# Patient Record
Sex: MALE | Race: WHITE | NOT HISPANIC OR LATINO | URBAN - METROPOLITAN AREA
[De-identification: names, ages, dates, MRNs, and addresses within clinical notes are randomized per-mention and may not be internally consistent; named-entity substitution may affect disease eponyms.]

---

## 2020-10-30 ENCOUNTER — EMERGENCY (EMERGENCY)
Facility: HOSPITAL | Age: 25
LOS: 0 days | Discharge: HOME | End: 2020-10-30
Payer: SELF-PAY

## 2020-10-30 VITALS
DIASTOLIC BLOOD PRESSURE: 57 MMHG | TEMPERATURE: 97 F | SYSTOLIC BLOOD PRESSURE: 137 MMHG | OXYGEN SATURATION: 100 % | RESPIRATION RATE: 19 BRPM | HEART RATE: 58 BPM

## 2020-10-30 DIAGNOSIS — B34.9 VIRAL INFECTION, UNSPECIFIED: ICD-10-CM

## 2020-10-30 DIAGNOSIS — J34.89 OTHER SPECIFIED DISORDERS OF NOSE AND NASAL SINUSES: ICD-10-CM

## 2020-10-30 LAB — SARS-COV-2 RNA SPEC QL NAA+PROBE: SIGNIFICANT CHANGE UP

## 2020-10-30 PROCEDURE — 99283 EMERGENCY DEPT VISIT LOW MDM: CPT

## 2020-10-30 PROCEDURE — U0003: CPT

## 2020-10-30 NOTE — ED STATDOCS - NSFOLLOWUPINSTRUCTIONS_ED_ALL_ED_FT
How to get your Coronavirus (COVID-19) Testing Results:   Please be advised that you were tested for the coronavirus (COVID-19) in the Emergency Department at [facility].  You are to maintain self-quarantine procedures for 14 days until instructed otherwise by one of our healthcare agents. Please note that the test may take up to 2-4 days to result.  If you do not hear from us within 72 hours and you'd like to check on your results, you can call on of our coronavirus specialists at [address] [place] [address] (available 24/7).  Please DO NOT call the site where you received the test to obtain your results.

## 2020-10-30 NOTE — ED STATDOCS - NS ED ROS FT
ROS: Constitutional- no fever, no chills.  Respiratory- no cough, no SOB  Cardiac- no chest pain, no palpitations, ENT- +rhinorrhea, no sore throat, +congestion.  Abdomen- No nausea, no vomiting, no diarrhea.  Urinary- no dysuria, no urgency, no frequency.  Skin- No rashes

## 2020-10-30 NOTE — ED ADULT TRIAGE NOTE - CHIEF COMPLAINT QUOTE
pt presents to ED for covid testing pt with yi pt provides verbal consent to receive results via text

## 2020-10-30 NOTE — ED ADULT NURSE NOTE - CAS ELECT INFOMATION PROVIDED
DISCHARGE INSTRUCTIONS REVIEWED WITH PATIENT VERBALLY, PT VERBALIZED UNDERSTANDING OF DISCHARGE INSTRUCTIONS. PAPER COPY OF DISCHARGE INSTRUCTIONS GIVEN TO PATIENT WITH SELF QUARANTINE AND COVID 19 INFORMATION.pt provides verbal consent to receive results via text or email/DC instructions

## 2020-10-30 NOTE — ED STATDOCS - PATIENT PORTAL LINK FT
You can access the FollowMyHealth Patient Portal offered by [facility] by registering at the following website: [email or web address]. By joining [facility]’s FollowMyHealth portal, you will also be able to view your health information using other applications (apps) compatible with our system.

## 2021-08-25 ENCOUNTER — INPATIENT (INPATIENT)
Facility: HOSPITAL | Age: 26
LOS: 0 days | Discharge: LEFT BEFORE DISCHARGE | End: 2021-08-25
Attending: SURGERY | Admitting: SURGERY
Payer: MEDICAID

## 2021-08-25 VITALS — WEIGHT: 169.98 LBS | HEIGHT: 69 IN

## 2021-08-25 VITALS
SYSTOLIC BLOOD PRESSURE: 126 MMHG | TEMPERATURE: 98 F | RESPIRATION RATE: 18 BRPM | DIASTOLIC BLOOD PRESSURE: 68 MMHG | HEART RATE: 62 BPM | OXYGEN SATURATION: 98 %

## 2021-08-25 DIAGNOSIS — S06.0X1A CONCUSSION WITH LOSS OF CONSCIOUSNESS OF 30 MINUTES OR LESS, INITIAL ENCOUNTER: ICD-10-CM

## 2021-08-25 PROCEDURE — 72125 CT NECK SPINE W/O DYE: CPT | Mod: 26,ME

## 2021-08-25 PROCEDURE — 70450 CT HEAD/BRAIN W/O DYE: CPT | Mod: 26,MA

## 2021-08-25 PROCEDURE — G1004: CPT

## 2021-08-25 PROCEDURE — 71250 CT THORAX DX C-: CPT | Mod: 26,ME

## 2021-08-25 PROCEDURE — 74176 CT ABD & PELVIS W/O CONTRAST: CPT | Mod: 26,ME

## 2021-08-25 PROCEDURE — 99283 EMERGENCY DEPT VISIT LOW MDM: CPT

## 2021-08-25 PROCEDURE — 99285 EMERGENCY DEPT VISIT HI MDM: CPT

## 2021-08-25 RX ORDER — ONDANSETRON 8 MG/1
4 TABLET, FILM COATED ORAL EVERY 6 HOURS
Refills: 0 | Status: DISCONTINUED | OUTPATIENT
Start: 2021-08-25 | End: 2021-08-25

## 2021-08-25 RX ORDER — ACETAMINOPHEN 500 MG
650 TABLET ORAL EVERY 6 HOURS
Refills: 0 | Status: DISCONTINUED | OUTPATIENT
Start: 2021-08-25 | End: 2021-08-25

## 2021-08-25 NOTE — ED PROVIDER NOTE - NSICDXNOPASTMEDICALHX_GEN_ALL_ED
"Chief Complaint   Patient presents with     Consult     Frequent incontinence of bowels       Vitals:    01/28/20 1800   BP: (!) 173/101   BP Location: Left arm   Patient Position: Sitting   Cuff Size: Adult Regular   Pulse: 93   Resp: 16   Temp: 97.8  F (36.6  C)   TempSrc: Oral   SpO2: 98%   Weight: 64.5 kg (142 lb 3.2 oz)   Height: 1.727 m (5' 8\")       Body mass index is 21.62 kg/m .      Georgia Kiser LPN                          " <-- Click to add NO pertinent Past Medical History
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
## 2021-08-25 NOTE — ED PROVIDER NOTE - FAMILY DETAILS FREE TEXT FOR MDM ADDL HISTORY OBTAINED FROM QUESTION
partner in person  Patient's father via phone Mr/Dr Camejo with patient's consent to discuss findings

## 2021-08-25 NOTE — ED PROVIDER NOTE - ENMT, MLM
Airway patent, Nasal mucosa clear. Mouth with normal mucosa. Throat has no vesicles, no oropharyngeal exudates and uvula is midline. Airway patent, Nasal mucosa clear. Mouth with normal mucosa. Throat has no vesicles, no oropharyngeal exudates and uvula is midline. no epistaxis No loose dentition.

## 2021-08-25 NOTE — H&P ADULT - NSHPLABSRESULTS_GEN_ALL_CORE
Vitals:  T(C): 36.8 (08-25 @ 18:23), Max: 36.8 (08-25 @ 18:23)  HR: 64 (08-25 @ 18:23) (64 - 64)  BP: 122/64 (08-25 @ 18:23) (122/64 - 122/64)  RR: 18 (08-25 @ 18:23) (18 - 18)  SpO2: 97% (08-25 @ 18:23) (97% - 97%)      < from: CT Head No Cont (08.25.21 @ 19:11) >      EXAM:  CT BRAIN                            PROCEDURE DATE:  08/25/2021          INTERPRETATION:  Clinical indications: Trauma    Technique:  Multiple axial sections were acquired from the base of the skull to the vertex without contrast enhancement.    Findings:  The lateral ventricles have a normal configuration.    There is no evidence of acute hemorrhage, mass or mass-effect in the posterior fossa or in the supratentorial region.    Evaluation of the osseous structures with the appropriate window appears unremarkable.    Minimal mucosal thickening seen involving both maxillary ethmoid and sphenoid sinuses.    Both mastoid and middle ear regions appear clear.    Impression:  Minimal mucosal thickening involving the paranasal sinuses as described above, otherwise noncontrast head CT.    --- End of Report ---            JESSICA MAURICIO MD; Attending Radiologist  This document has been electronically signed. Aug 25 2021  7:00PM    < end of copied text > Labs:    EXAM:  CT ABDOMEN AND PELVIS                        EXAM:  CT CHEST                        PROCEDURE DATE:  08/25/2021    INTERPRETATION:  CLINICAL INFORMATION: Trauma  COMPARISON: None.  IMPRESSION:  Mild age-indeterminate loss of height of T4 vertebral body. Correlate with the point tenderness/site of injury.  Otherwise no acute findings chest abdomen and pelvis on this noncontrast study    EXAM:  CT CERVICAL SPINE                        PROCEDURE DATE:  08/25/2021    INTERPRETATION:  Clinical indication: Trauma  IMPRESSION: No acute fracture or dislocation is seen involving the cervical spine.    EXAM:  CT BRAIN                        PROCEDURE DATE:  08/25/2021    INTERPRETATION:  Clinical indications: Trauma  Impression:  Minimal mucosal thickening involving the paranasal sinuses as described above, otherwise noncontrast head CT.

## 2021-08-25 NOTE — H&P ADULT - NSHPPHYSICALEXAM_GEN_ALL_CORE
Physical Exam:  General: AAOx3, Well developed, NAD  HEENT: Tenderness to left posterior temporal region with no obvious deformities or step-offs  Chest: Normal respiratory effort  Heart: RRR  Abdomen: Soft, NTND, no masses  Neuro/Psych: Experiencing dizziness and slurred speech; No localized deficits.   Skin: Normal, no rashes, no lesions noted.   Extremities: Warm, well perfused, no edema, Pulses intact Physical Exam:  General: AAOx3, Well developed, NAD  HEENT: Tenderness to left posterior temporal region with no obvious deformities or step-offs  Chest: Normal respiratory effort  Heart: RRR  Abdomen: Soft, NTND, no masses  Neuro/Psych: Experiencing dizziness and slurred speech; No localized deficits.   Skin: Normal, no rashes, no lesions noted.   Extremities: Warm, well perfused, no edema, Pulses intact    Attending exam:  GCS of 15  Airway is patent  Breathing is symmetric and unlabored  Neuro: CNII-XII grossly intact  Psych: normal affect  HEENT: Normocephalic, +tenderness to left occipital region, NOAM, EOM wnl, no otorrhea or hemotympanum b/l, no epistaxis or d/c b/l nares,   Neck: Soft and supple, nontender to passive/active ROM exam. No crepitus, no ecchymosis, no hematoma, to exam, no JVD, no tracheal deviation  Cspine/thoracolumbrosacral spine: no gross bony pathology or tenderness to exam  Cardiovascular: S1S2 Present  Chest: no gross rib pathology or tenderness to exam. No sternal pathology or tenderness to exam. No crepitus, no ecchymosis, no hematoma. No penetrating thorcoabdominal trauma  Respiratory: Rate is 18; Respiratory Effort normal; no wheezes, rales or rhonchi to exam  ABD: bowel sounds (+), soft, nontender, non distended, no rebound, no guarding, no rigidity, no skin changes to exam. No pelvic instability to exam, no skin changes  Genitourinary: No scrotal/perineal/perirectal hematoma/ecchymosis/tenderness to exam  External genitalia: normal, no blood at urethral meatus  Musculoskeletal: Pt has palpable b/l radial, femoral, dorsalis pedis pulses. All digits are warm and well perfused. No gross long bone pathology or tenderness to exam. Pt demonstrates grossly intact sensoromotor function. Pt has good capillary refill to digits, no calf edema or tenderness to exam.  Skin: no lesions or rashes to exam  Vitals: see trauma flow sheet
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
## 2021-08-25 NOTE — ED ADULT NURSE REASSESSMENT NOTE - NS ED NURSE REASSESS COMMENT FT1
pt states he wants to sign out AMA, he states "I am not doing a covid swab, I am vaccinated, I want to go home." educated pt about the policy regarding admission pts needing a covid swab, he states he will not do one. called surgical resident MD Shi regarding pt wanting to sign out AMA, MD Shi spoke with pt as well pt still voiced that he wanted to sign out AMA. MD shi signed AMA forms, I witnessed and signed as well. Charge ZAK barnett.
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
## 2021-08-25 NOTE — ED PROVIDER NOTE - EYES, MLM
Clear bilaterally, pupils equal, round and reactive to light. Clear bilaterally, pupils equal, round and reactive to light. EOIM. Visual fields intact x 4 quadrants.
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
## 2021-08-25 NOTE — ED PROVIDER NOTE - CONSULTANT FREE TEXT FOR MDM DISCUSSED CASE WITH QUESTION
Dr Oshea - Via Dr. Marie resident physician team (thank you for coming to so quickly and attending this patient's care)
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
## 2021-08-25 NOTE — ED PROVIDER NOTE - CARDIAC, MLM
Normal rate, regular rhythm.  Heart sounds S1, S2.  No murmurs, rubs or gallops. Normal rate, regular rhythm.  Heart sounds S1, S2.  No rubs or gallops. 2+ pulses in bilateral dp and radial arteries. Cap refill less than 3 seconds.
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
## 2021-08-25 NOTE — CHART NOTE - NSCHARTNOTEFT_GEN_A_CORE
Patient adamant about leaving AMA  Explained the risks of head trauma and concussion and benefits of close observation  Patient resistant to required COVID screening and Neuro monitoring  Patient given paperwork and allowed to leave AMA    Dr. Preciado notified.
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
## 2021-08-25 NOTE — ED ADULT NURSE NOTE - NS ED NURSE DISCH DISPOSITION
AMA (saw a physician/midlevel provider and clinician was able to provide reasons for staying for treatment & form is signed)
unknown
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
## 2021-08-25 NOTE — ED PROVIDER NOTE - ENMT NEGATIVE STATEMENT, MLM
Ears: no ear pain and no hearing problems intermittent tinnitus. Nose: no nasal congestion and no nasal drainage. Mouth/Throat: no dysphagia, no hoarseness and no throat pain. Neck: no lumps, no pain, no stiffness and no swollen glands.

## 2021-08-25 NOTE — ED PROVIDER NOTE - NS_ ATTENDINGSCRIBEDETAILS _ED_A_ED_FT
I Jose Manuel Lofton MD saw and examined the patient. Scribe documented for me and under my supervision. I have modified the scribe's documentation where necessary to reflect my history, physical exam and other relevant documentations pertinent to the care of the patient.
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
## 2021-08-26 NOTE — CHART NOTE - NSCHARTNOTEFT_GEN_A_CORE
Was called at 6:51am regarding consult for head injury. Noted patient had signed out AMA in early morning hours, per ED staff patient refused COVID Swab.  Patient can follow up in concussion clinic as outpatient can call [phone] for appointment.
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
## 2021-08-30 DIAGNOSIS — S06.0X1A CONCUSSION WITH LOSS OF CONSCIOUSNESS OF 30 MINUTES OR LESS, INITIAL ENCOUNTER: ICD-10-CM
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
## 2022-02-25 ENCOUNTER — FORM ENCOUNTER (OUTPATIENT)
Age: 27
End: 2022-02-25

## 2022-08-10 ENCOUNTER — APPOINTMENT (OUTPATIENT)
Dept: OBGYN | Facility: CLINIC | Age: 27
End: 2022-08-10

## 2022-08-11 ENCOUNTER — NON-APPOINTMENT (OUTPATIENT)
Age: 27
End: 2022-08-11

## 2023-01-21 PROBLEM — Z78.9 OTHER SPECIFIED HEALTH STATUS: Chronic | Status: ACTIVE | Noted: 2021-08-25

## 2023-01-25 ENCOUNTER — RESULT CHARGE (OUTPATIENT)
Age: 28
End: 2023-01-25

## 2023-01-25 ENCOUNTER — APPOINTMENT (OUTPATIENT)
Dept: FAMILY MEDICINE | Facility: CLINIC | Age: 28
End: 2023-01-25
Payer: MEDICAID

## 2023-01-25 ENCOUNTER — NON-APPOINTMENT (OUTPATIENT)
Age: 28
End: 2023-01-25

## 2023-01-25 DIAGNOSIS — Z78.9 OTHER SPECIFIED HEALTH STATUS: ICD-10-CM

## 2023-01-25 DIAGNOSIS — Z82.3 FAMILY HISTORY OF STROKE: ICD-10-CM

## 2023-01-25 DIAGNOSIS — Z00.00 ENCOUNTER FOR GENERAL ADULT MEDICAL EXAMINATION W/OUT ABNORMAL FINDINGS: ICD-10-CM

## 2023-01-25 DIAGNOSIS — Z82.49 FAMILY HISTORY OF ISCHEMIC HEART DISEASE AND OTHER DISEASES OF THE CIRCULATORY SYSTEM: ICD-10-CM

## 2023-01-25 DIAGNOSIS — Z83.438 FAMILY HISTORY OF OTHER DISORDER OF LIPOPROTEIN METABOLISM AND OTHER LIPIDEMIA: ICD-10-CM

## 2023-01-25 DIAGNOSIS — Z83.3 FAMILY HISTORY OF DIABETES MELLITUS: ICD-10-CM

## 2023-01-25 LAB
BILIRUB UR QL STRIP: NORMAL
CLARITY UR: CLEAR
COLLECTION METHOD: NORMAL
GLUCOSE UR-MCNC: NORMAL
HCG UR QL: 1 EU/DL
HGB UR QL STRIP.AUTO: NORMAL
KETONES UR-MCNC: NORMAL
LEUKOCYTE ESTERASE UR QL STRIP: NORMAL
NITRITE UR QL STRIP: NORMAL
PH UR STRIP: 6
PROT UR STRIP-MCNC: NORMAL
SP GR UR STRIP: 1.02

## 2023-01-25 PROCEDURE — 81003 URINALYSIS AUTO W/O SCOPE: CPT | Mod: QW

## 2023-01-25 PROCEDURE — 92551 PURE TONE HEARING TEST AIR: CPT

## 2023-01-25 PROCEDURE — 99385 PREV VISIT NEW AGE 18-39: CPT | Mod: 25

## 2023-01-25 PROCEDURE — 99173 VISUAL ACUITY SCREEN: CPT

## 2023-01-25 RX ORDER — DEXTROAMPHETAMINE SACCHARATE, AMPHETAMINE ASPARTATE MONOHYDRATE, DEXTROAMPHETAMINE SULFATE AND AMPHETAMINE SULFATE 5; 5; 5; 5 MG/1; MG/1; MG/1; MG/1
20 CAPSULE, EXTENDED RELEASE ORAL DAILY
Qty: 30 | Refills: 0 | Status: DISCONTINUED | COMMUNITY
Start: 2022-08-11 | End: 2023-01-25

## 2023-01-25 NOTE — HEALTH RISK ASSESSMENT
[Former] : Former [Yes] : Yes [Monthly or less (1 pt)] : Monthly or less (1 point) [1 or 2 (0 pts)] : 1 or 2 (0 points) [Never (0 pts)] : Never (0 points) [No] : In the past 12 months have you used drugs other than those required for medical reasons? No [No falls in past year] : Patient reported no falls in the past year [0] : 2) Feeling down, depressed, or hopeless: Not at all (0) [PHQ-2 Negative - No further assessment needed] : PHQ-2 Negative - No further assessment needed [HIV test declined] : HIV test declined [Hepatitis C test declined] : Hepatitis C test declined [With Significant Other] : lives with significant other [# of Members in Household ___] :  household currently consist of [unfilled] member(s) [Employed] : employed [College] : College [Single] : single [Sexually Active] : sexually active [Feels Safe at Home] : Feels safe at home [Fully functional (bathing, dressing, toileting, transferring, walking, feeding)] : Fully functional (bathing, dressing, toileting, transferring, walking, feeding) [Fully functional (using the telephone, shopping, preparing meals, housekeeping, doing laundry, using] : Fully functional and needs no help or supervision to perform IADLs (using the telephone, shopping, preparing meals, housekeeping, doing laundry, using transportation, managing medications and managing finances) [Reports normal functional visual acuity (ie: able to read med bottle)] : Reports normal functional visual acuity [Smoke Detector] : smoke detector [Carbon Monoxide Detector] : carbon monoxide detector [Safety elements used in home] : safety elements used in home [Seat Belt] :  uses seat belt [Sunscreen] : uses sunscreen [Travel to Developing Areas] : travel to developing areas [Audit-CScore] : 1 [FME0Ayekf] : 0 [Change in mental status noted] : No change in mental status noted [Language] : denies difficulty with language [Behavior] : denies difficulty with behavior [Learning/Retaining New Information] : denies difficulty learning/retaining new information [Handling Complex Tasks] : denies difficulty handling complex tasks [Reasoning] : denies difficulty with reasoning [Spatial Ability and Orientation] : denies difficulty with spatial ability and orientation [Reports changes in hearing] : Reports no changes in hearing [Reports changes in vision] : Reports no changes in vision [Reports changes in dental health] : Reports no changes in dental health [Guns at Home] : no guns at home [TB Exposure] : is not being exposed to tuberculosis [Caregiver Concerns] : does not have caregiver concerns [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

## 2023-01-25 NOTE — PHYSICAL EXAM

## 2023-01-26 LAB
ALBUMIN SERPL ELPH-MCNC: 4.8 G/DL
ALP BLD-CCNC: 56 U/L
ALT SERPL-CCNC: 42 U/L
ANION GAP SERPL CALC-SCNC: 14 MMOL/L
AST SERPL-CCNC: 27 U/L
BASOPHILS # BLD AUTO: 0.05 K/UL
BASOPHILS NFR BLD AUTO: 0.9 %
BILIRUB SERPL-MCNC: 0.7 MG/DL
BUN SERPL-MCNC: 14 MG/DL
CALCIUM SERPL-MCNC: 10.2 MG/DL
CHLORIDE SERPL-SCNC: 104 MMOL/L
CHOLEST SERPL-MCNC: 182 MG/DL
CO2 SERPL-SCNC: 22 MMOL/L
CREAT SERPL-MCNC: 0.96 MG/DL
EGFR: 112 ML/MIN/1.73M2
EOSINOPHIL # BLD AUTO: 0.71 K/UL
EOSINOPHIL NFR BLD AUTO: 12.2 %
GLUCOSE SERPL-MCNC: 86 MG/DL
HBV SURFACE AB SER QL: REACTIVE
HCT VFR BLD CALC: 44.3 %
HCV AB SER QL: NONREACTIVE
HCV S/CO RATIO: 0.06 S/CO
HDLC SERPL-MCNC: 66 MG/DL
HGB BLD-MCNC: 15.1 G/DL
IMM GRANULOCYTES NFR BLD AUTO: 0.2 %
LDLC SERPL CALC-MCNC: 96 MG/DL
LYMPHOCYTES # BLD AUTO: 1.81 K/UL
LYMPHOCYTES NFR BLD AUTO: 31.2 %
MAN DIFF?: NORMAL
MCHC RBC-ENTMCNC: 29.8 PG
MCHC RBC-ENTMCNC: 34.1 GM/DL
MCV RBC AUTO: 87.4 FL
MEV IGG FLD QL IA: 26.6 AU/ML
MEV IGG+IGM SER-IMP: POSITIVE
MONOCYTES # BLD AUTO: 0.57 K/UL
MONOCYTES NFR BLD AUTO: 9.8 %
NEUTROPHILS # BLD AUTO: 2.66 K/UL
NEUTROPHILS NFR BLD AUTO: 45.7 %
NONHDLC SERPL-MCNC: 116 MG/DL
PLATELET # BLD AUTO: 267 K/UL
POTASSIUM SERPL-SCNC: 4.9 MMOL/L
PROT SERPL-MCNC: 6.7 G/DL
RBC # BLD: 5.07 M/UL
RBC # FLD: 14.1 %
RUBV IGG FLD-ACNC: 0.7 INDEX
RUBV IGG SER-IMP: NEGATIVE
SODIUM SERPL-SCNC: 140 MMOL/L
T3FREE SERPL-MCNC: 3.5 PG/ML
T4 FREE SERPL-MCNC: 1.2 NG/DL
TRIGL SERPL-MCNC: 97 MG/DL
TSH SERPL-ACNC: 1.43 UIU/ML
VZV AB TITR SER: POSITIVE
VZV IGG SER IF-ACNC: 347.5 INDEX
WBC # FLD AUTO: 5.81 K/UL

## 2023-01-27 LAB
MUV AB SER-ACNC: NORMAL
MUV IGG SER QL IA: 9.1 AU/ML

## 2023-01-31 ENCOUNTER — TRANSCRIPTION ENCOUNTER (OUTPATIENT)
Age: 28
End: 2023-01-31

## 2023-02-01 ENCOUNTER — APPOINTMENT (OUTPATIENT)
Dept: FAMILY MEDICINE | Facility: CLINIC | Age: 28
End: 2023-02-01
Payer: MEDICAID

## 2023-02-01 VITALS
HEIGHT: 69 IN | BODY MASS INDEX: 27.4 KG/M2 | HEART RATE: 69 BPM | TEMPERATURE: 98.2 F | OXYGEN SATURATION: 97 % | WEIGHT: 185 LBS

## 2023-02-01 VITALS — DIASTOLIC BLOOD PRESSURE: 64 MMHG | SYSTOLIC BLOOD PRESSURE: 120 MMHG

## 2023-02-01 PROCEDURE — 99213 OFFICE O/P EST LOW 20 MIN: CPT | Mod: 25

## 2023-02-01 PROCEDURE — 90471 IMMUNIZATION ADMIN: CPT

## 2023-02-01 PROCEDURE — 90707 MMR VACCINE SC: CPT

## 2023-02-01 PROCEDURE — 85610 PROTHROMBIN TIME: CPT | Mod: QW

## 2023-02-01 NOTE — HISTORY OF PRESENT ILLNESS
[FreeTextEntry8] : Pt is here for MMR vaccine and PPD #2.\par Also needed new Rx for adderal since pharmacy did not have it in stock\par Feels well today. No recent colds or infections.

## 2023-02-21 NOTE — ED ADULT NURSE NOTE - PUPILS PERRL
PLAN  Activity restrictions: Continue to take it easy. No bending over, sleep elevated, avoid scrubbing / rubbing area.   Apply Vaseline to suture lines twice daily.   Apply Nitro paste to purple area of nose flap twice daily for 7 days. RN will call you to update you if Dr. Ayala has further recommendations.    Monitor for signs of infection including, but not limited to fevers, chills, redness along incision, drainage from incisions, purulent drainage from incision or drain, etc.    Follow-up in clinic in 1 week for 2 week post-op follow-up    yes

## 2023-03-15 ENCOUNTER — NON-APPOINTMENT (OUTPATIENT)
Age: 28
End: 2023-03-15

## 2023-04-04 ENCOUNTER — APPOINTMENT (OUTPATIENT)
Dept: INTERNAL MEDICINE | Facility: CLINIC | Age: 28
End: 2023-04-04
Payer: MEDICAID

## 2023-04-04 VITALS
SYSTOLIC BLOOD PRESSURE: 128 MMHG | HEIGHT: 68.5 IN | WEIGHT: 180 LBS | HEART RATE: 65 BPM | BODY MASS INDEX: 26.97 KG/M2 | RESPIRATION RATE: 18 BRPM | DIASTOLIC BLOOD PRESSURE: 75 MMHG | OXYGEN SATURATION: 96 % | TEMPERATURE: 97.4 F

## 2023-04-04 DIAGNOSIS — Z87.891 PERSONAL HISTORY OF NICOTINE DEPENDENCE: ICD-10-CM

## 2023-04-04 PROCEDURE — 94729 DIFFUSING CAPACITY: CPT

## 2023-04-04 PROCEDURE — 99204 OFFICE O/P NEW MOD 45 MIN: CPT | Mod: 25

## 2023-04-04 PROCEDURE — ZZZZZ: CPT

## 2023-04-04 PROCEDURE — 94727 GAS DIL/WSHOT DETER LNG VOL: CPT

## 2023-04-04 PROCEDURE — 94060 EVALUATION OF WHEEZING: CPT

## 2023-04-04 RX ORDER — BUDESONIDE 90 UG/1
90 AEROSOL, POWDER RESPIRATORY (INHALATION)
Qty: 1 | Refills: 5 | Status: DISCONTINUED | COMMUNITY
Start: 2023-03-13 | End: 2023-04-04

## 2023-04-04 NOTE — DISCUSSION/SUMMARY
[FreeTextEntry1] : Mr. Camejo is a 28-year-old male who presents for pulmonary evaluation.  He has a history of mild, persistent asthma.  He is currently well controlled on Asmanex 100 mcg 1 puff twice daily.  He does not require albuterol for rescue therapy.  Complete pulmonary function test show no evidence of obstructive lung disease.  Patient does not require oral steroids.  He will follow-up in this office in 1 year.

## 2023-04-04 NOTE — HISTORY OF PRESENT ILLNESS
[TextBox_4] : Mr. Camejo is a 28-year-old male who presents for initial pulmonary evaluation.  The patient has a history of intermittent asthma since childhood.  He is currently asymptomatic.  The patient is currently taking Asmanex 100 mcg 1 puff daily.  He has no nocturnal symptoms of cough or shortness of breath.  There is no exercise-induced symptoms.  He is able to run 4 miles without difficulty.  Mr. Camejo has never been admitted to the hospital for asthma or required emergency room treatment.  Patient has not required oral corticosteroids.  Patient smoked socially from age 16 and stopped smoking at age 24.

## 2023-04-04 NOTE — PROCEDURE
[FreeTextEntry1] : Complete pulmonary function test were performed.\par FVC 5.52 L which is 106% predicted.\par FEV1 4.90 L which is 113% predicted.\par FEV1/FVC ratio 89%.\par FEF 25/75% 6.59 L/s which is 144% predicted.\par PEF 12.08 L/s which is 122% predicted.\par Postbronchodilator: FVC 5.59 L which is 107% predicted.  FEV1 4.90 L which is 112% predicted.  FEV1/FVC ratio 104%.  There is no significant bronchodilator response.\par Total lung capacity is 6.56 L which is 96% predicted.\par Diffusion capacity is 106% predicted.\par \par Complete pulmonary function test showed no evidence of significant obstructive or restrictive lung disease.  Diffusing capacity is normal.

## 2023-04-19 NOTE — ED PROVIDER NOTE - NEUROLOGICAL, MLM
- - - CN 2-12 intact, NIH=0, GCS=15, 5/5 strength upper and lower extremities, no nuchal rigidity, Alert and oriented, no focal deficits, no motor or sensory deficits.

## 2023-04-22 ENCOUNTER — APPOINTMENT (OUTPATIENT)
Dept: FAMILY MEDICINE | Facility: CLINIC | Age: 28
End: 2023-04-22
Payer: MEDICAID

## 2023-04-22 VITALS
HEIGHT: 68.5 IN | SYSTOLIC BLOOD PRESSURE: 118 MMHG | DIASTOLIC BLOOD PRESSURE: 82 MMHG | BODY MASS INDEX: 26.97 KG/M2 | TEMPERATURE: 98.4 F | HEART RATE: 71 BPM | OXYGEN SATURATION: 98 % | WEIGHT: 180 LBS

## 2023-04-22 DIAGNOSIS — Z92.29 PERSONAL HISTORY OF OTHER DRUG THERAPY: ICD-10-CM

## 2023-04-22 PROCEDURE — 99214 OFFICE O/P EST MOD 30 MIN: CPT

## 2023-04-22 NOTE — HISTORY OF PRESENT ILLNESS
[FreeTextEntry6] : ot is here for medication rx\par Meds working well, no side effects\par Doing well on ADD Meds. No side effects. No weight gain. No weight loss. No excessive jitteriness. No difficulties. Working on level of concentration\par

## 2023-06-06 ENCOUNTER — APPOINTMENT (OUTPATIENT)
Dept: FAMILY MEDICINE | Facility: CLINIC | Age: 28
End: 2023-06-06
Payer: MEDICAID

## 2023-06-06 VITALS
BODY MASS INDEX: 26.97 KG/M2 | SYSTOLIC BLOOD PRESSURE: 122 MMHG | WEIGHT: 180 LBS | HEIGHT: 68.5 IN | TEMPERATURE: 98.7 F | HEART RATE: 68 BPM | DIASTOLIC BLOOD PRESSURE: 60 MMHG | OXYGEN SATURATION: 97 %

## 2023-06-06 PROCEDURE — 99214 OFFICE O/P EST MOD 30 MIN: CPT | Mod: 25

## 2023-06-06 NOTE — HISTORY OF PRESENT ILLNESS
[Other: ___] : [unfilled] [FreeTextEntry6] : Pt is here for med renewal.\par Doing well on ADD Meds. No side effects. No weight gain. No weight loss. No excessive jitteriness. No difficulties. Working on level of concentration\par \par

## 2023-08-25 ENCOUNTER — RX RENEWAL (OUTPATIENT)
Age: 28
End: 2023-08-25

## 2023-08-28 DIAGNOSIS — J45.30 MILD PERSISTENT ASTHMA, UNCOMPLICATED: ICD-10-CM

## 2023-08-28 DIAGNOSIS — J98.01 ACUTE BRONCHOSPASM: ICD-10-CM

## 2023-08-28 RX ORDER — MOMETASONE FUROATE 100 UG/1
100 AEROSOL RESPIRATORY (INHALATION) DAILY
Qty: 2 | Refills: 12 | Status: ACTIVE | COMMUNITY
Start: 2023-08-28 | End: 1900-01-01

## 2023-11-13 DIAGNOSIS — Z86.59 PERSONAL HISTORY OF OTHER MENTAL AND BEHAVIORAL DISORDERS: ICD-10-CM

## 2024-04-15 RX ORDER — MOMETASONE FUROATE 100 UG/1
100 AEROSOL RESPIRATORY (INHALATION) DAILY
Qty: 2 | Refills: 1 | Status: ACTIVE | COMMUNITY
Start: 2023-03-15 | End: 1900-01-01

## 2024-04-15 RX ORDER — DEXTROAMPHETAMINE SACCHARATE, AMPHETAMINE ASPARTATE, DEXTROAMPHETAMINE SULFATE AND AMPHETAMINE SULFATE 5; 5; 5; 5 MG/1; MG/1; MG/1; MG/1
20 TABLET ORAL TWICE DAILY
Qty: 60 | Refills: 0 | Status: ACTIVE | COMMUNITY
Start: 2022-08-11 | End: 1900-01-01

## 2024-04-15 RX ORDER — FINASTERIDE 1 MG/1
1 TABLET ORAL
Qty: 90 | Refills: 1 | Status: ACTIVE | COMMUNITY
Start: 2023-06-06 | End: 1900-01-01

## 2024-04-15 RX ORDER — ALBUTEROL SULFATE 90 UG/1
108 (90 BASE) INHALANT RESPIRATORY (INHALATION) 4 TIMES DAILY
Qty: 2 | Refills: 6 | Status: ACTIVE | COMMUNITY
Start: 2023-08-28 | End: 1900-01-01

## 2024-12-02 ENCOUNTER — APPOINTMENT (OUTPATIENT)
Dept: UROLOGY | Facility: CLINIC | Age: 29
End: 2024-12-02